# Patient Record
Sex: FEMALE | Race: BLACK OR AFRICAN AMERICAN | URBAN - METROPOLITAN AREA
[De-identification: names, ages, dates, MRNs, and addresses within clinical notes are randomized per-mention and may not be internally consistent; named-entity substitution may affect disease eponyms.]

---

## 2021-05-30 ENCOUNTER — APPOINTMENT (OUTPATIENT)
Dept: GENERAL RADIOLOGY | Age: 31
End: 2021-05-30
Payer: MEDICAID

## 2021-05-30 ENCOUNTER — HOSPITAL ENCOUNTER (EMERGENCY)
Age: 31
Discharge: HOME | End: 2021-05-30
Attending: STUDENT IN AN ORGANIZED HEALTH CARE EDUCATION/TRAINING PROGRAM
Payer: MEDICAID

## 2021-05-30 VITALS
RESPIRATION RATE: 14 BRPM | SYSTOLIC BLOOD PRESSURE: 103 MMHG | HEART RATE: 79 BPM | DIASTOLIC BLOOD PRESSURE: 75 MMHG | OXYGEN SATURATION: 100 %

## 2021-05-30 DIAGNOSIS — I47.1 PAROXYSMAL SUPRAVENTRICULAR TACHYCARDIA (HCC): Primary | ICD-10-CM

## 2021-05-30 LAB
A/G RATIO: 1.2 (ref 1.1–2.2)
ALBUMIN SERPL-MCNC: 4.3 G/DL (ref 3.4–5)
ALP BLD-CCNC: 65 U/L (ref 40–129)
ALT SERPL-CCNC: 21 U/L (ref 10–40)
ANION GAP SERPL CALCULATED.3IONS-SCNC: 15 MMOL/L (ref 3–16)
AST SERPL-CCNC: 22 U/L (ref 15–37)
BASOPHILS ABSOLUTE: 0.2 K/UL (ref 0–0.2)
BASOPHILS RELATIVE PERCENT: 2.6 %
BILIRUB SERPL-MCNC: 0.5 MG/DL (ref 0–1)
BUN BLDV-MCNC: 13 MG/DL (ref 7–20)
CALCIUM SERPL-MCNC: 9.8 MG/DL (ref 8.3–10.6)
CHLORIDE BLD-SCNC: 102 MMOL/L (ref 99–110)
CO2: 23 MMOL/L (ref 21–32)
CREAT SERPL-MCNC: 0.9 MG/DL (ref 0.6–1.1)
D DIMER: 225 NG/ML DDU (ref 0–229)
EOSINOPHILS ABSOLUTE: 0.1 K/UL (ref 0–0.6)
EOSINOPHILS RELATIVE PERCENT: 1.1 %
GFR AFRICAN AMERICAN: >60
GFR NON-AFRICAN AMERICAN: >60
GLOBULIN: 3.5 G/DL
GLUCOSE BLD-MCNC: 161 MG/DL (ref 70–99)
HCG QUALITATIVE: NEGATIVE
HCT VFR BLD CALC: 39.9 % (ref 36–48)
HEMOGLOBIN: 13.4 G/DL (ref 12–16)
LYMPHOCYTES ABSOLUTE: 2.8 K/UL (ref 1–5.1)
LYMPHOCYTES RELATIVE PERCENT: 41.9 %
MCH RBC QN AUTO: 28.8 PG (ref 26–34)
MCHC RBC AUTO-ENTMCNC: 33.6 G/DL (ref 31–36)
MCV RBC AUTO: 85.7 FL (ref 80–100)
MONOCYTES ABSOLUTE: 0.3 K/UL (ref 0–1.3)
MONOCYTES RELATIVE PERCENT: 5.1 %
NEUTROPHILS ABSOLUTE: 3.3 K/UL (ref 1.7–7.7)
NEUTROPHILS RELATIVE PERCENT: 49.3 %
PDW BLD-RTO: 13.7 % (ref 12.4–15.4)
PLATELET # BLD: 283 K/UL (ref 135–450)
PMV BLD AUTO: 7.8 FL (ref 5–10.5)
POTASSIUM SERPL-SCNC: 4.1 MMOL/L (ref 3.5–5.1)
RBC # BLD: 4.65 M/UL (ref 4–5.2)
SODIUM BLD-SCNC: 140 MMOL/L (ref 136–145)
TOTAL PROTEIN: 7.8 G/DL (ref 6.4–8.2)
TROPONIN: <0.01 NG/ML
WBC # BLD: 6.8 K/UL (ref 4–11)

## 2021-05-30 PROCEDURE — 84484 ASSAY OF TROPONIN QUANT: CPT

## 2021-05-30 PROCEDURE — 84703 CHORIONIC GONADOTROPIN ASSAY: CPT

## 2021-05-30 PROCEDURE — 36415 COLL VENOUS BLD VENIPUNCTURE: CPT

## 2021-05-30 PROCEDURE — 84443 ASSAY THYROID STIM HORMONE: CPT

## 2021-05-30 PROCEDURE — 85379 FIBRIN DEGRADATION QUANT: CPT

## 2021-05-30 PROCEDURE — 80053 COMPREHEN METABOLIC PANEL: CPT

## 2021-05-30 PROCEDURE — 71045 X-RAY EXAM CHEST 1 VIEW: CPT

## 2021-05-30 PROCEDURE — 85025 COMPLETE CBC W/AUTO DIFF WBC: CPT

## 2021-05-30 PROCEDURE — 93005 ELECTROCARDIOGRAM TRACING: CPT | Performed by: PHYSICIAN ASSISTANT

## 2021-05-30 PROCEDURE — 99283 EMERGENCY DEPT VISIT LOW MDM: CPT

## 2021-05-30 RX ORDER — ADENOSINE 3 MG/ML
INJECTION, SOLUTION INTRAVENOUS
Status: DISCONTINUED
Start: 2021-05-30 | End: 2021-05-30 | Stop reason: HOSPADM

## 2021-05-30 RX ORDER — METOPROLOL SUCCINATE 25 MG/1
25 TABLET, EXTENDED RELEASE ORAL DAILY
COMMUNITY
Start: 2021-05-13

## 2021-05-30 ASSESSMENT — ENCOUNTER SYMPTOMS
WHEEZING: 0
SHORTNESS OF BREATH: 1
ABDOMINAL PAIN: 0
NAUSEA: 0
CHEST TIGHTNESS: 1
DIARRHEA: 0
COUGH: 0
RHINORRHEA: 0
VOMITING: 0

## 2021-05-30 NOTE — ED PROVIDER NOTES
I independently performed a history and physical on [address]. All diagnostic, treatment, and disposition decisions were made by myself in conjunction with the advanced practice provider. Briefly, this is a 36 y.o. female here for palpitations sensation and heart racing. She has a history of SVT that is paroxysmal.  She has had multiple episodes of SVT that usually improve after vagal maneuvers. She is going to be undergoing an ablation next week with cardiology at [facility]. She states that rapid heart rate occurred around 2 PM without any known trigger. Per EMS patient was in SVT when they picked her up. She performed vagal maneuvers in the ambulance and converted to normal sinus rhythm. Currently asymptomatic in the emergency room. On exam pt is resting comfortably  Cardiac RRR, no murmur  Lungs clear bilaterally, no increased work of breathing  Abdomen soft nontender  No calf edema or tenderness  Neuro no drift in extremities       EKG  The Ekg interpreted by me in the absence of a cardiologist shows. normal sinus rhythm with a rate of 91  Axis is   Normal  QTc is  normal  Intervals and Durations are unremarkable. No specific ST-T wave changes appreciated. No evidence of acute ischemia. No prior for comparison     XR CHEST PORTABLE   Final Result   No acute process.            Labs Reviewed   COMPREHENSIVE METABOLIC PANEL - Abnormal; Notable for the following components:       Result Value    Glucose 161 (*)     All other components within normal limits    Narrative:     Performed at:  [facility] [address], [place], [facility] [place]   Phone [phone]   CBC WITH AUTO DIFFERENTIAL    Narrative:     Performed at:  [facility] [address], [place], [facility] [place]   Phone [phone]   HCG, SERUM, QUALITATIVE    Narrative:     Performed at:  [facility] [address], [place], [place] [address]   Phone [phone]   TROPONIN    Narrative:     Performed at:  [facility] [address], [place], [facility] [employer]   Phone [phone]   D-DIMER, QUANTITATIVE    Narrative:     Performed at:  [facility] [address], [place], [facility] [address]   Phone [phone]   TSH WITH REFLEX     Medications - No data to display    XR CHEST PORTABLE   Final Result   No acute process. Labs Reviewed   COMPREHENSIVE METABOLIC PANEL - Abnormal; Notable for the following components:       Result Value    Glucose 161 (*)     All other components within normal limits    Narrative:     Performed at:  [facility] [address], [place], [facility] [address] [facility]   Phone [phone]   CBC WITH AUTO DIFFERENTIAL    Narrative:     Performed at:  [facility] [address], [place], [facility] [address]   Phone [phone]   HCG, SERUM, QUALITATIVE    Narrative:     Performed at:  [facility] [address], [place], [facility] [address] [employer]   Phone [phone]   TROPONIN    Narrative:     Performed at:  [facility] [address], [place], [facility] [address]   Phone [phone]   D-DIMER, QUANTITATIVE    Narrative:     Performed at:  [facility] [address], [place], [facility] [address]   Phone [phone]   TSH WITH REFLEX     Medications - No data to display  Patient is a 68-year-old female with history of paroxysmal SVT due for ablation next week at [facility] presenting with heart racing sensation acutely without known trigger. EMS reports that she was in SVT and converted to sinus rhythm after vagal maneuvers in the ambulance. On arrival to the emergency room she is asymptomatic and hemodynamically stable. No clinically significant electrolyte derangements, anemia here.   EKG troponin not suggestive of acute cardiac ischemia. I have low suspicion for PE and with a negative D-dimer today this has been ruled out. At this time she stable for follow-up with her cardiologist next week for ablation. She should return to the emergency room for any new or worsening symptoms, heart palpitations, heart racing, chest pain or trouble breathing. She is agreeable to plan. Patient Referrals:  your cardiologist    Go to   next week for ablation, as scheduled    [facility] Emergency Department  [address] [place] [address]  [phone]  Go to   If symptoms worsen      Discharge Medications:  Discharge Medication List as of 5/30/2021  7:01 PM          FINAL IMPRESSION  1. Paroxysmal supraventricular tachycardia (HCC)        Blood pressure 103/75, pulse 79, resp. rate 14, last menstrual period 05/21/2021, SpO2 100 %.      For further details of Dipak Emmanuel emergency department encounter, please see documentation by advanced practice provider       Atul Son MD  05/30/21 9360

## 2021-05-30 NOTE — LETTER
[facility] Emergency Department  [address] [facility], [address]             May 30, 2021    Patient: Preston Valles   YOB: 1990   Date of Visit: 5/30/2021       To Whom It May Concern:    Shlomo Yi was seen and treated in our emergency department on 5/30/2021. She may return to work on 6/1/2021.       Sincerely,         [facility] ED

## 2021-05-30 NOTE — ED PROVIDER NOTES
[address] [facility]        Pt Name: Carlo Vásquez  MRN: [id number]  [place] 1990  Date of evaluation: 5/30/2021  Provider: Yasmin Barriga PA-C  PCP: No primary care provider on file. Note Started: 4:31 PM EDT        I have seen and evaluated this patient with my supervising physician James Seymour MD.    Jaci Dorota       Chief Complaint   Patient presents with    Irregular Heart Beat       HISTORY OF PRESENT ILLNESS   (Location, Timing/Onset, Context/Setting, Quality, Duration, Modifying Factors, Severity, Associated Signs and Symptoms)  Note limiting factors. Carlo Vásquez is a 36 y.o. female who presents with a Chief Complaint of SVT. She states that she noticed her heart starting to race around 2 PM.  States that she has had several episodes of SVT in the past and has seen a cardiologist or using is scheduled for an ablation next week. States that this 1st happened back in February and she thought that that was an allergic reaction to her birth control. States that she had the Nexplanon removed from her arm and subsequently had episode that she attributed to panic attack. States that most recent episode was last month and she converted with vagal maneuvers. States that she does not feel like she is having difficulty breathing. Chest pressure 8/10. She denies dizziness/lightheadedness, weakness, visual disturbance or syncope. She has no abdominal pain nausea vomiting or diarrhea. No other complaints or concerns at this time. Nursing Notes were all reviewed and agreed with or any disagreements were addressed in the HPI. REVIEW OF SYSTEMS    (2-9 systems for level 4, 10 or more for level 5)     Review of Systems   Constitutional: Negative for appetite change, chills and fever. HENT: Negative for congestion and rhinorrhea. Eyes: Negative for visual disturbance.    Respiratory: Positive for chest tightness and shortness of breath. Negative for cough and wheezing. Cardiovascular: Positive for palpitations. Negative for chest pain. Gastrointestinal: Negative for abdominal pain, diarrhea, nausea and vomiting. Genitourinary: Negative for difficulty urinating, dysuria and hematuria. Musculoskeletal: Negative for neck pain and neck stiffness. Skin: Negative for rash. Neurological: Negative for dizziness, syncope, weakness, light-headedness and headaches. Positives and Pertinent negatives as per HPI. Except as noted above in the ROS, all other systems were reviewed and negative. PAST MEDICAL HISTORY     Past Medical History:   Diagnosis Date    SVT (supraventricular tachycardia) ([facility] [address]          SURGICAL HISTORY   History reviewed. No pertinent surgical history. CURRENTMEDICATIONS       Previous Medications    METOPROLOL SUCCINATE (TOPROL XL) 25 MG EXTENDED RELEASE TABLET    Take 25 mg by mouth daily         ALLERGIES     Patient has no known allergies. FAMILYHISTORY     History reviewed. No pertinent family history. SOCIAL HISTORY       Social History     Tobacco Use    Smoking status: Current Every Day Smoker     Packs/day: 0.50     Types: Cigarettes   Substance Use Topics    Alcohol use: Yes    Drug use: Not Currently       SCREENINGS             PHYSICAL EXAM    (up to 7 for level 4, 8 or more for level 5)     ED Triage Vitals   BP Temp Temp src Pulse Resp SpO2 Height Weight   -- -- -- -- -- -- -- --       Physical Exam  Vitals and nursing note reviewed. Constitutional:       General: She is not in acute distress. Appearance: She is well-developed. She is not toxic-appearing or diaphoretic. HENT:      Head: Normocephalic and atraumatic. Right Ear: External ear normal.      Left Ear: External ear normal.      Nose: Nose normal.   Eyes:      General:         Right eye: No discharge. Left eye: No discharge. Cardiovascular:      Rate and Rhythm: Regular rhythm. Tachycardia present. Heart sounds: Normal heart sounds. Pulmonary:      Effort: Pulmonary effort is normal. No respiratory distress. Breath sounds: Normal breath sounds. Chest:      Chest wall: No tenderness. Abdominal:      General: There is no distension. Palpations: Abdomen is soft. Tenderness: There is no abdominal tenderness. Musculoskeletal:         General: Normal range of motion. Cervical back: Normal range of motion and neck supple. Skin:     General: Skin is warm and dry. Neurological:      Mental Status: She is alert and oriented to person, place, and time. Mental status is at baseline. GCS: GCS eye subscore is 4. GCS verbal subscore is 5. GCS motor subscore is 6. Cranial Nerves: Cranial nerves are intact. Psychiatric:         Behavior: Behavior normal.         DIAGNOSTIC RESULTS   LABS:    Labs Reviewed   COMPREHENSIVE METABOLIC PANEL - Abnormal; Notable for the following components:       Result Value    Glucose 161 (*)     All other components within normal limits    Narrative:     Performed at:  [facility] [address]   Phone [phone]   CBC WITH AUTO DIFFERENTIAL    Narrative:     Performed at:  [facility] [address]   Phone [phone]   HCG, SERUM, QUALITATIVE    Narrative:     Performed at:  [facility] [address]   Phone [phone]   TROPONIN    Narrative:     Performed at:  [facility] [address]   Phone [phone]   D-DIMER, QUANTITATIVE    Narrative:     Performed at:  [facility] [address] [facility] [employer]   Phone [phone]   TSH WITH REFLEX       All other labs were within normal range or not returned as of this dictation. EKG:  All EKG's are interpreted by the Emergency Department Physician in the absence of a cardiologist.  Please see their note for interpretation of EKG. RADIOLOGY:   Non-plain film images such as CT, Ultrasound and MRI are read by the radiologist. Plain radiographic images are visualized and preliminarily interpreted by the ED Provider with the below findings:        Interpretation per the Radiologist below, if available at the time of this note:    XR CHEST PORTABLE   Final Result   No acute process. No results found. PROCEDURES   Unless otherwise noted below, none     Procedures    CRITICAL CARE TIME   N/A    CONSULTS:  None      EMERGENCY DEPARTMENT COURSE and DIFFERENTIAL DIAGNOSIS/MDM:   Vitals:    Vitals:    05/30/21 1800 05/30/21 1815 05/30/21 1830 05/30/21 1845   BP: 117/82 120/81 (!) 115/99 103/75   Pulse: 81 83 78 79   Resp: 22 20 20 14   SpO2: 100% 99% 98% 100%       Patient was given the following medications:  Medications   adenosine (ADENOCARD) 6 MG/2ML injection (has no administration in time range)           Patient presents for evaluation of reported SVT. EMS reports tachycardia to 220 via squad prior to arrival.  They were unable to obtain IV access. No medications given. They attempted vagal maneuvers apparently without success. On exam, she appears little anxious but is in no acute distress and nontoxic. Vitals are stable and she is afebrile. Lungs are clear to auscultation bilaterally, chest is nontender and abdomen is benign. Please see attending note for EKG interpretation. CBC and CMP are unremarkable. Troponin is negative. D-dimer is negative. TSH is pending. Beta-hCG is negative. Chest x-ray shows no acute process. Patient remained in normal sinus throughout entire stay in the ED. She was encouraged to follow-up with her cardiologist/electrophysiologist next week 3 UC as scheduled for cardiac ablation. Conditions for return to the ED were discussed.     The patient has been evaluated and the history and physical exam suggest a benign etiology. I see nothing to suggest acute coronary syndrome, myocardial infarction, pulmonary embolism, thoracic aortic dissection, significant pericarditis, pneumonia, pneumothorax, or acute abdomen. I feel the patient can be safely discharged to home with outpatient follow up. Instructions have been given for the patient to return to the Emergency Department for any worsening of the symptoms, including but not limited to increased pain, shortness of breath, abdominal pain or weakness. She is agreeable to this plan and stable for discharge at this time. FINAL IMPRESSION      1.  Paroxysmal supraventricular tachycardia [facility])          DISPOSITION/PLAN   DISPOSITION Decision To Discharge 05/30/2021 06:57:01 PM      PATIENT REFERRED TO:  your cardiologist    Go to   next week for ablation, as scheduled    [facility] Emergency Department  [place] [address]  [phone]  Go to   If symptoms worsen      DISCHARGE MEDICATIONS:  New Prescriptions    No medications on file       DISCONTINUED MEDICATIONS:  Discontinued Medications    No medications on file              (Please note that portions of this note were completed with a voice recognition program.  Efforts were made to edit the dictations but occasionally words are mis-transcribed.)    Hever Ward PA-C (electronically signed)           Hua Candelario PA-C  05/30/21 5993

## 2021-05-30 NOTE — LETTER
[facility] Emergency Department  [address] [facility], [address]             May 30, 2021    Patient: Shaheed Braswell   YOB: 1990   Date of Visit: 5/30/2021       To Whom It May Concern:    Janet Parker was seen and treated in our emergency department on 5/30/2021. She may return to work on 6/2/2021.       Sincerely,         [facility] ED

## 2021-05-31 LAB
EKG ATRIAL RATE: 91 BPM
EKG DIAGNOSIS: NORMAL
EKG P AXIS: 71 DEGREES
EKG P-R INTERVAL: 142 MS
EKG Q-T INTERVAL: 350 MS
EKG QRS DURATION: 78 MS
EKG QTC CALCULATION (BAZETT): 430 MS
EKG R AXIS: 71 DEGREES
EKG T AXIS: 17 DEGREES
EKG VENTRICULAR RATE: 91 BPM
TSH REFLEX: 0.65 UIU/ML (ref 0.27–4.2)

## 2021-05-31 PROCEDURE — 93010 ELECTROCARDIOGRAM REPORT: CPT | Performed by: INTERNAL MEDICINE
